# Patient Record
Sex: FEMALE | Race: BLACK OR AFRICAN AMERICAN | NOT HISPANIC OR LATINO | Employment: OTHER | URBAN - METROPOLITAN AREA
[De-identification: names, ages, dates, MRNs, and addresses within clinical notes are randomized per-mention and may not be internally consistent; named-entity substitution may affect disease eponyms.]

---

## 2018-04-26 ENCOUNTER — HOSPITAL ENCOUNTER (OUTPATIENT)
Dept: RHEUMATOLOGY | Facility: CLINIC | Age: 63
Discharge: HOME | End: 2018-04-26
Attending: NURSE PRACTITIONER | Admitting: NURSE PRACTITIONER

## 2019-04-05 ENCOUNTER — HOSPITAL ENCOUNTER (OUTPATIENT)
Dept: RHEUMATOLOGY | Facility: CLINIC | Age: 64
Discharge: HOME | End: 2019-04-05
Attending: NURSE PRACTITIONER | Admitting: NURSE PRACTITIONER

## 2019-07-15 ENCOUNTER — LAB (OUTPATIENT)
Dept: LAB | Facility: HOSPITAL | Age: 64
End: 2019-07-15

## 2019-07-15 ENCOUNTER — TRANSCRIBE ORDERS (OUTPATIENT)
Dept: ADMINISTRATIVE | Facility: HOSPITAL | Age: 64
End: 2019-07-15

## 2019-07-15 DIAGNOSIS — Z79.899 HIGH RISK MEDICATION USE: ICD-10-CM

## 2019-07-15 DIAGNOSIS — Z79.899 HIGH RISK MEDICATION USE: Primary | ICD-10-CM

## 2019-07-15 LAB
ALBUMIN SERPL-MCNC: 3.8 G/DL (ref 3.5–4.8)
ALBUMIN/GLOB SERPL: 1 G/DL (ref 1–1.7)
ALP SERPL-CCNC: 56 U/L (ref 32–91)
ALT SERPL W P-5'-P-CCNC: 33 U/L (ref 14–54)
ANION GAP SERPL CALCULATED.3IONS-SCNC: 13.8 MMOL/L (ref 5–15)
AST SERPL-CCNC: 31 U/L (ref 15–41)
BASOPHILS # BLD AUTO: 0 10*3/MM3 (ref 0–0.2)
BASOPHILS NFR BLD AUTO: 0.2 % (ref 0–1.5)
BILIRUB CONJ SERPL-MCNC: 0.1 MG/DL (ref 0.1–0.5)
BILIRUB SERPL-MCNC: 0.7 MG/DL (ref 0.3–1.2)
BUN BLD-MCNC: 15 MG/DL (ref 8–20)
BUN/CREAT SERPL: 16.7 (ref 5.4–26.2)
CALCIUM SPEC-SCNC: 9.7 MG/DL (ref 8.9–10.3)
CHLORIDE SERPL-SCNC: 103 MMOL/L (ref 101–111)
CO2 SERPL-SCNC: 24 MMOL/L (ref 22–32)
CREAT BLD-MCNC: 0.9 MG/DL (ref 0.4–1)
CRP SERPL-MCNC: 0.49 MG/DL (ref 0–0.7)
DEPRECATED RDW RBC AUTO: 54.3 FL (ref 37–54)
EOSINOPHIL # BLD AUTO: 0 10*3/MM3 (ref 0–0.4)
EOSINOPHIL NFR BLD AUTO: 0.2 % (ref 0.3–6.2)
ERYTHROCYTE [DISTWIDTH] IN BLOOD BY AUTOMATED COUNT: 16.3 % (ref 12.3–15.4)
ERYTHROCYTE [SEDIMENTATION RATE] IN BLOOD: 64 MM/HR (ref 0–30)
GFR SERPL CREATININE-BSD FRML MDRD: 76 ML/MIN/1.73
GLOBULIN UR ELPH-MCNC: 4 GM/DL (ref 2.5–3.8)
GLUCOSE BLD-MCNC: 131 MG/DL (ref 65–99)
HCT VFR BLD AUTO: 35.3 % (ref 34–46.6)
HGB BLD-MCNC: 11.5 G/DL (ref 12–15.9)
LYMPHOCYTES # BLD AUTO: 2 10*3/MM3 (ref 0.7–3.1)
LYMPHOCYTES NFR BLD AUTO: 21.6 % (ref 19.6–45.3)
MCH RBC QN AUTO: 31 PG (ref 26.6–33)
MCHC RBC AUTO-ENTMCNC: 32.7 G/DL (ref 31.5–35.7)
MCV RBC AUTO: 94.9 FL (ref 79–97)
MONOCYTES # BLD AUTO: 1.3 10*3/MM3 (ref 0.1–0.9)
MONOCYTES NFR BLD AUTO: 13.9 % (ref 5–12)
NEUTROPHILS # BLD AUTO: 5.8 10*3/MM3 (ref 1.7–7)
NEUTROPHILS NFR BLD AUTO: 64.1 % (ref 42.7–76)
NRBC BLD AUTO-RTO: 0.1 /100 WBC (ref 0–0.2)
PLATELET # BLD AUTO: 347 10*3/MM3 (ref 140–450)
PMV BLD AUTO: 7 FL (ref 6–12)
POTASSIUM BLD-SCNC: 3.8 MMOL/L (ref 3.6–5.1)
PROT SERPL-MCNC: 7.8 G/DL (ref 6.1–7.9)
RBC # BLD AUTO: 3.72 10*6/MM3 (ref 3.77–5.28)
SODIUM BLD-SCNC: 137 MMOL/L (ref 136–144)
WBC NRBC COR # BLD: 9.1 10*3/MM3 (ref 3.4–10.8)

## 2019-07-15 PROCEDURE — 36415 COLL VENOUS BLD VENIPUNCTURE: CPT

## 2019-07-15 PROCEDURE — 80053 COMPREHEN METABOLIC PANEL: CPT

## 2019-07-15 PROCEDURE — 85025 COMPLETE CBC W/AUTO DIFF WBC: CPT

## 2019-07-15 PROCEDURE — 86140 C-REACTIVE PROTEIN: CPT

## 2019-07-15 PROCEDURE — 82248 BILIRUBIN DIRECT: CPT

## 2019-07-15 PROCEDURE — 85652 RBC SED RATE AUTOMATED: CPT

## 2019-09-04 ENCOUNTER — INPATIENT HOSPITAL (OUTPATIENT)
Dept: URBAN - METROPOLITAN AREA HOSPITAL 76 | Facility: HOSPITAL | Age: 64
End: 2019-09-04

## 2019-09-04 DIAGNOSIS — R19.7 DIARRHEA, UNSPECIFIED: ICD-10-CM

## 2019-09-04 DIAGNOSIS — I10 ESSENTIAL (PRIMARY) HYPERTENSION: ICD-10-CM

## 2019-09-04 DIAGNOSIS — R11.2 NAUSEA WITH VOMITING, UNSPECIFIED: ICD-10-CM

## 2019-09-04 DIAGNOSIS — D64.89 OTHER SPECIFIED ANEMIAS: ICD-10-CM

## 2019-09-04 DIAGNOSIS — E86.0 DEHYDRATION: ICD-10-CM

## 2019-09-04 DIAGNOSIS — E11.9 TYPE 2 DIABETES MELLITUS WITHOUT COMPLICATIONS: ICD-10-CM

## 2019-09-04 PROCEDURE — 99222 1ST HOSP IP/OBS MODERATE 55: CPT | Performed by: NURSE PRACTITIONER

## 2019-09-05 ENCOUNTER — INPATIENT HOSPITAL (OUTPATIENT)
Dept: URBAN - METROPOLITAN AREA HOSPITAL 76 | Facility: HOSPITAL | Age: 64
End: 2019-09-05

## 2019-09-05 DIAGNOSIS — I10 ESSENTIAL (PRIMARY) HYPERTENSION: ICD-10-CM

## 2019-09-05 DIAGNOSIS — R19.7 DIARRHEA, UNSPECIFIED: ICD-10-CM

## 2019-09-05 DIAGNOSIS — R11.2 NAUSEA WITH VOMITING, UNSPECIFIED: ICD-10-CM

## 2019-09-05 DIAGNOSIS — D64.89 OTHER SPECIFIED ANEMIAS: ICD-10-CM

## 2019-09-05 DIAGNOSIS — E11.9 TYPE 2 DIABETES MELLITUS WITHOUT COMPLICATIONS: ICD-10-CM

## 2019-09-05 DIAGNOSIS — E86.0 DEHYDRATION: ICD-10-CM

## 2019-09-05 PROCEDURE — 99231 SBSQ HOSP IP/OBS SF/LOW 25: CPT | Performed by: NURSE PRACTITIONER

## 2019-10-30 ENCOUNTER — LAB (OUTPATIENT)
Dept: LAB | Facility: HOSPITAL | Age: 64
End: 2019-10-30

## 2019-10-30 ENCOUNTER — OFFICE VISIT (OUTPATIENT)
Dept: RHEUMATOLOGY | Facility: CLINIC | Age: 64
End: 2019-10-30

## 2019-10-30 VITALS
HEIGHT: 64 IN | SYSTOLIC BLOOD PRESSURE: 127 MMHG | WEIGHT: 221 LBS | HEART RATE: 60 BPM | DIASTOLIC BLOOD PRESSURE: 85 MMHG | BODY MASS INDEX: 37.73 KG/M2

## 2019-10-30 DIAGNOSIS — M05.9 SEROPOSITIVE RHEUMATOID ARTHRITIS (HCC): ICD-10-CM

## 2019-10-30 DIAGNOSIS — M17.0 OSTEOARTHRITIS OF BOTH KNEES, UNSPECIFIED OSTEOARTHRITIS TYPE: ICD-10-CM

## 2019-10-30 DIAGNOSIS — M54.50 LOW BACK PAIN WITHOUT SCIATICA, UNSPECIFIED BACK PAIN LATERALITY, UNSPECIFIED CHRONICITY: ICD-10-CM

## 2019-10-30 DIAGNOSIS — Z79.899 LONG-TERM USE OF HIGH-RISK MEDICATION: ICD-10-CM

## 2019-10-30 DIAGNOSIS — M05.9 SEROPOSITIVE RHEUMATOID ARTHRITIS (HCC): Primary | ICD-10-CM

## 2019-10-30 DIAGNOSIS — Z23 NEED FOR VACCINATION: ICD-10-CM

## 2019-10-30 PROCEDURE — G0008 ADMIN INFLUENZA VIRUS VAC: HCPCS | Performed by: INTERNAL MEDICINE

## 2019-10-30 PROCEDURE — 99214 OFFICE O/P EST MOD 30 MIN: CPT | Performed by: INTERNAL MEDICINE

## 2019-10-30 PROCEDURE — 90653 IIV ADJUVANT VACCINE IM: CPT | Performed by: INTERNAL MEDICINE

## 2019-10-30 RX ORDER — DULOXETIN HYDROCHLORIDE 30 MG/1
30 CAPSULE, DELAYED RELEASE ORAL 2 TIMES DAILY
Qty: 60 CAPSULE | Refills: 2 | Status: SHIPPED | OUTPATIENT
Start: 2019-10-30

## 2019-10-30 RX ORDER — LISINOPRIL 10 MG/1
TABLET ORAL DAILY
Refills: 2 | COMMUNITY
Start: 2019-10-01

## 2019-10-30 RX ORDER — HYDROCODONE BITARTRATE AND ACETAMINOPHEN 7.5; 325 MG/1; MG/1
TABLET ORAL
Refills: 0 | COMMUNITY
Start: 2019-10-01

## 2019-10-30 RX ORDER — ATORVASTATIN CALCIUM 20 MG/1
TABLET, FILM COATED ORAL
Refills: 0 | COMMUNITY
Start: 2019-09-14

## 2019-10-30 RX ORDER — SULFASALAZINE 500 MG/1
2 TABLET ORAL 2 TIMES DAILY
COMMUNITY
Start: 2018-04-30

## 2019-10-30 RX ORDER — ERGOCALCIFEROL 1.25 MG/1
CAPSULE ORAL
Refills: 3 | COMMUNITY
Start: 2019-07-26

## 2019-11-01 ENCOUNTER — TELEPHONE (OUTPATIENT)
Dept: RHEUMATOLOGY | Facility: CLINIC | Age: 64
End: 2019-11-01

## 2019-11-01 DIAGNOSIS — M54.50 LOW BACK PAIN WITHOUT SCIATICA, UNSPECIFIED BACK PAIN LATERALITY, UNSPECIFIED CHRONICITY: Primary | ICD-10-CM

## 2019-11-01 DIAGNOSIS — M17.0 OSTEOARTHRITIS OF BOTH KNEES, UNSPECIFIED OSTEOARTHRITIS TYPE: ICD-10-CM

## 2019-11-01 RX ORDER — DULOXETIN HYDROCHLORIDE 30 MG/1
CAPSULE, DELAYED RELEASE ORAL
Qty: 180 CAPSULE | Refills: 2 | OUTPATIENT
Start: 2019-11-01

## 2019-11-01 NOTE — TELEPHONE ENCOUNTER
Ysabel called c/o cymbalta causing her to have diarrhea. She says that she cannot take this medication and wants to know if you could put her on something else that would not cause this problem

## 2019-11-02 NOTE — TELEPHONE ENCOUNTER
Ask her to quit for a week and then resume taking 30 mg po every other day for about a week and if OK increase the dose to 30 mg po daily for a month or so, if she tolerates the medication, increase to 30 mg po bid. We will go from there.    I put orders for labs to be done after her visit. I did not see results yet. I see that they were cancelled. Just want to make sure those labs are done. Thanks!

## 2019-11-04 NOTE — TELEPHONE ENCOUNTER
Informed pt of your note, but with the way that the medication makes her feel she does not want to take it at all. I informed pt that the next step was for her to just do PT. Pt is willing to do PT. Order put in

## 2019-11-15 DIAGNOSIS — M05.9 SEROPOSITIVE RHEUMATOID ARTHRITIS (HCC): Primary | ICD-10-CM

## 2020-02-05 ENCOUNTER — OFFICE VISIT (OUTPATIENT)
Dept: RHEUMATOLOGY | Facility: CLINIC | Age: 65
End: 2020-02-05

## 2020-02-05 VITALS
WEIGHT: 211 LBS | BODY MASS INDEX: 36.02 KG/M2 | SYSTOLIC BLOOD PRESSURE: 112 MMHG | HEIGHT: 64 IN | DIASTOLIC BLOOD PRESSURE: 73 MMHG | HEART RATE: 76 BPM

## 2020-02-05 DIAGNOSIS — Z79.899 LONG-TERM USE OF HIGH-RISK MEDICATION: ICD-10-CM

## 2020-02-05 DIAGNOSIS — M17.0 OSTEOARTHRITIS OF BOTH KNEES, UNSPECIFIED OSTEOARTHRITIS TYPE: ICD-10-CM

## 2020-02-05 DIAGNOSIS — M05.9 SEROPOSITIVE RHEUMATOID ARTHRITIS (HCC): Primary | ICD-10-CM

## 2020-02-05 DIAGNOSIS — M25.532 WRIST PAIN, LEFT: ICD-10-CM

## 2020-02-05 PROCEDURE — 99213 OFFICE O/P EST LOW 20 MIN: CPT | Performed by: INTERNAL MEDICINE

## 2020-02-05 PROCEDURE — 20605 DRAIN/INJ JOINT/BURSA W/O US: CPT | Performed by: INTERNAL MEDICINE

## 2020-02-05 RX ORDER — METHYLPREDNISOLONE ACETATE 40 MG/ML
20 INJECTION, SUSPENSION INTRA-ARTICULAR; INTRALESIONAL; INTRAMUSCULAR; SOFT TISSUE ONCE
Status: COMPLETED | OUTPATIENT
Start: 2020-02-05 | End: 2020-02-05

## 2020-02-05 RX ORDER — PREDNISONE 10 MG/1
10 TABLET ORAL DAILY
Qty: 14 TABLET | Refills: 0 | Status: SHIPPED | OUTPATIENT
Start: 2020-02-05

## 2020-02-05 RX ADMIN — METHYLPREDNISOLONE ACETATE 20 MG: 40 INJECTION, SUSPENSION INTRA-ARTICULAR; INTRALESIONAL; INTRAMUSCULAR; SOFT TISSUE at 15:00

## 2020-02-05 NOTE — PROGRESS NOTES
HPI:  The patient is a 64-year-old -American very pleasant female who comes today in follow-up for management of seropositive rheumatoid arthritis.  The patient takes Xeljanz 11 mg p.o. daily, sulfasalazine 2 g a day.  She responded very well to this medication in the past and she has been doing very well up until 7 to 10 days ago when she developed progressive joint pain in both wrists but most prominent on the left.  She has not noticed joint swelling, no erythema.  Overall she is feeling more symptomatic.  Today her pain is 6 out of 10, she has stiffness in knees that lasts all day long.    On the last visit, I started her on Cymbalta for management of osteoarthritis of the knees and pain in the lower extremities, she started with 30 mg p.o. a day and was able to tolerate the medication relatively well but when she increased the medication to 60 mg a day she developed diarrhea.  She is currently using the lower dose of Cymbalta with good results.    She is known to have problems with her low back and sees pain management, she received a set of injections about a week ago but has not noticed any major changes yet.    Review of systems is negative for fever or chills, she has been intentionally losing some weight.  No dry eyes or dry mouth, no oral nasal ulcers, denies nausea vomiting, no diarrhea, no chest pain or shortness of breath.  All other systems reviewed and they were negative.    Laboratories reviewed for this visit show normal kidney and liver function tests, she has a ESR 46, CRP 0.36 the rest of the laboratories are for the most part unremarkable.    Social and family history reviewed and unchanged.    Rheumatologic history:  Sero (+) RA, CCP > 250 diagnosed > 20 years ago  Failure to: Enbrel, other anti TNF and MTX  Xeljanz restarted on February 14, 2017  Sulfasalazine started on September 22, 2017        hepatitis panel (4/18) negative  QUANT GOLD (10/18) negative           Active Ambulatory  Problems     Diagnosis Date Noted   • No Active Ambulatory Problems     Resolved Ambulatory Problems     Diagnosis Date Noted   • No Resolved Ambulatory Problems     Past Medical History:   Diagnosis Date   • Arthritis    • History of chicken pox    • History of measles    • HTN (hypertension)    • Migraine headache          Current Outpatient Medications   Medication Sig Dispense Refill   • atorvastatin (LIPITOR) 20 MG tablet TK 1 T PO Q NIGHT  0   • DULoxetine (CYMBALTA) 30 MG capsule Take 1 capsule by mouth 2 (Two) Times a Day. 60 capsule 2   • HYDROcodone-acetaminophen (NORCO) 7.5-325 MG per tablet TK 1 T PO QID UTD  0   • lisinopril (PRINIVIL,ZESTRIL) 10 MG tablet Take  by mouth Daily.  2   • sulfaSALAzine (AZULFIDINE) 500 MG tablet Take 2 tablets by mouth 2 (Two) Times a Day.     • Tofacitinib Citrate (XELJANZ) 11 MG tablet sustained-release 24 hour Take 1 tablet by mouth Daily. 90 tablet 0   • vitamin D (ERGOCALCIFEROL) 1.25 MG (55418 UT) capsule capsule TK 1 C PO 1 TIME A WK  3     No current facility-administered medications for this visit.        Physical exam:    Vitals:    02/05/20 1046   BP: 112/73   Pulse: 76     GENERAL: Well-developed, well-nourished in no acute distress. Alert and oriented x3.  HEENT: Normocephalic, atraumatic. Pupils are equal, round, and reactive to light. Extraocular muscles are intact. Mucous membranes are pink and moist. Nostrils are clear.   NECK: Supple without lymphadenopathy.  LUNGS: Clear to auscultation bilaterally.  HEART: Regular rate and rhythm without murmur, rub or gallop.  CHEST: Respirations easy and unlabored.  EXTREMITIES: No cyanosis, edema or clubbing.  SKIN: Warm, dry and intact.  MSK: Tenderness in the left wrist.  Preserved range of motion.  Mild tenderness in 3 MCP joints.  No synovitis noted.    Assessment:  1.  Seropositive rheumatoid arthritis.  Deteriorated.  The patient is currently having a flare of her RA.  Will proceed with a corticosteroid  injection in the wrist and give her a low-dose of prednisone for 2 weeks..  We will continue with the same treatment otherwise with no changes.    2.  Long-term high-risk medications, the patient is on medication for management of inflammatory arthritis.  I am monitoring for side effects.  Recommend to update her vaccinations and age-appropriate cancer screening.    Cancer screening   Colonoscopy:4yrs PAP: unknown Mammogram: 2018  Bone health  calcium and vitamin D: No, DXA: 02/18/2016  Vaccines  Flu: 2019 PNA: (13) 12/21/2017 Zoster: no   X-rays of the chest: 12/2016 Hands: 12/12/2016 Feet: 2014  Hepatitis panel, HIV, QTB/PPD:  12/15/2016      3.  Left wrist pain.  I will proceed with corticosteroid ration of the left wrist.    Wrist injection procedure note:    The risks and the benefits as well as the most common side effects were explained in detail to the patient and after the patient consented to the procedure, the area was prepped in an aseptic fashion and 20 mg of Depo-Medrol + 0.5 mL of lidocaine were introduced in the joint space.  The procedure did not have any immediate complications and tolerated it well.  The patient was recommended to contact our office if there were any signs of infection or worsening symptoms.    The patient was discharged at her baseline with stable vital signs.    4. body mass index of 36.  Improving, she is intentionally trying to lose weight.     5. Osteoarthritis of the knees.  Continue with Cymbalta 30 mg p.o. a day.    Plan:  Continue with Xeljanz 11 mg 1 tablet p.o. daily  Continue sulfasalazine 2 g a day  Corticosteroid injection in the left wrist  Prednisone 10 mg p.o. daily for 2 weeks  Continue with Cymbalta 30 mg p.o. daily  Weight loss through healthy diet and exercise  RTC 3 months or sooner if needed.    Orders Placed This Encounter   Procedures   • Comprehensive Metabolic Panel     Standing Status:   Future   • C-reactive Protein     Standing Status:   Future   •  Sedimentation Rate     Standing Status:   Future   • CBC With Manual Differential     Standing Status:   Future

## 2020-02-07 ENCOUNTER — TELEPHONE (OUTPATIENT)
Dept: RHEUMATOLOGY | Facility: CLINIC | Age: 65
End: 2020-02-07

## 2020-02-07 NOTE — TELEPHONE ENCOUNTER
Xeljanz - Received PAP form from [employer].  This is filled out and on Dr. CRANE's desk for signature.

## 2020-02-25 ENCOUNTER — TELEPHONE (OUTPATIENT)
Dept: RHEUMATOLOGY | Facility: CLINIC | Age: 65
End: 2020-02-25

## 2020-02-25 DIAGNOSIS — Z79.899 LONG-TERM USE OF HIGH-RISK MEDICATION: ICD-10-CM

## 2020-02-25 DIAGNOSIS — M05.9 SEROPOSITIVE RHEUMATOID ARTHRITIS (HCC): Primary | ICD-10-CM

## 2020-02-25 NOTE — TELEPHONE ENCOUNTER
Xeljanz - Physician portion of PAP form filled out and signed.  Faxed to [employer], waiting on response.

## 2020-02-25 NOTE — TELEPHONE ENCOUNTER
Pt was given in injection at her last visit and you gave her prednisone for 2 weeks which she says helped. Now that she is out of the prednisone she has pain in her hand, arm, and back of neck. She wants to know what she should do about this

## 2020-02-26 DIAGNOSIS — M05.9 SEROPOSITIVE RHEUMATOID ARTHRITIS (HCC): ICD-10-CM

## 2020-02-26 DIAGNOSIS — Z79.899 LONG-TERM USE OF HIGH-RISK MEDICATION: ICD-10-CM

## 2020-02-26 RX ORDER — PREDNISONE 1 MG/1
5 TABLET ORAL DAILY
Qty: 30 TABLET | Refills: 0 | Status: SHIPPED | OUTPATIENT
Start: 2020-02-26

## 2020-02-26 RX ORDER — FAMOTIDINE 20 MG/1
20 TABLET, FILM COATED ORAL DAILY
Qty: 30 TABLET | Refills: 0 | Status: SHIPPED | OUTPATIENT
Start: 2020-02-26 | End: 2020-06-01

## 2020-02-26 NOTE — TELEPHONE ENCOUNTER
Please start her on prednisone 5 mg p.o. a day until I see her in the follow-up with close monitoring of her blood pressure and blood sugar.  Please asked her to take calcium and vitamin D.  Please send a prescription for Pepcid 20 mg p.o. a day, please explain to the patient that this is to protect her stomach from prednisone.  I will see her in follow-up as planned.  Please asked her to contact the office if she presents with worsening symptoms and in that case, I believe that we will need to change her therapy.

## 2020-02-28 RX ORDER — FAMOTIDINE 20 MG/1
20 TABLET, FILM COATED ORAL DAILY
Qty: 90 TABLET | OUTPATIENT
Start: 2020-02-28

## 2020-04-16 ENCOUNTER — LAB (OUTPATIENT)
Dept: LAB | Facility: HOSPITAL | Age: 65
End: 2020-04-16

## 2020-04-16 DIAGNOSIS — Z79.899 LONG-TERM USE OF HIGH-RISK MEDICATION: ICD-10-CM

## 2020-04-16 DIAGNOSIS — M17.0 OSTEOARTHRITIS OF BOTH KNEES, UNSPECIFIED OSTEOARTHRITIS TYPE: ICD-10-CM

## 2020-04-16 DIAGNOSIS — M05.9 SEROPOSITIVE RHEUMATOID ARTHRITIS (HCC): ICD-10-CM

## 2020-04-16 DIAGNOSIS — M25.532 WRIST PAIN, LEFT: ICD-10-CM

## 2020-04-16 LAB
ALBUMIN SERPL-MCNC: 4.7 G/DL (ref 3.5–5.2)
ALBUMIN/GLOB SERPL: 1.3 G/DL
ALP SERPL-CCNC: 77 U/L (ref 39–117)
ALT SERPL W P-5'-P-CCNC: 27 U/L (ref 1–33)
ANION GAP SERPL CALCULATED.3IONS-SCNC: 11.9 MMOL/L (ref 5–15)
AST SERPL-CCNC: 24 U/L (ref 1–32)
BILIRUB SERPL-MCNC: 0.3 MG/DL (ref 0.2–1.2)
BUN BLD-MCNC: 18 MG/DL (ref 8–23)
BUN/CREAT SERPL: 20.9 (ref 7–25)
CALCIUM SPEC-SCNC: 10.1 MG/DL (ref 8.6–10.5)
CHLORIDE SERPL-SCNC: 101 MMOL/L (ref 98–107)
CO2 SERPL-SCNC: 29.1 MMOL/L (ref 22–29)
CREAT BLD-MCNC: 0.86 MG/DL (ref 0.57–1)
CRP SERPL-MCNC: 0.05 MG/DL (ref 0–0.5)
DEPRECATED RDW RBC AUTO: 50.8 FL (ref 37–54)
ERYTHROCYTE [DISTWIDTH] IN BLOOD BY AUTOMATED COUNT: 14.8 % (ref 12.3–15.4)
ERYTHROCYTE [SEDIMENTATION RATE] IN BLOOD: 39 MM/HR (ref 0–30)
GFR SERPL CREATININE-BSD FRML MDRD: 80 ML/MIN/1.73
GLOBULIN UR ELPH-MCNC: 3.6 GM/DL
GLUCOSE BLD-MCNC: 98 MG/DL (ref 65–99)
HCT VFR BLD AUTO: 36.9 % (ref 34–46.6)
HGB BLD-MCNC: 11.9 G/DL (ref 12–15.9)
LYMPHOCYTES # BLD MANUAL: 2.36 10*3/MM3 (ref 0.7–3.1)
LYMPHOCYTES NFR BLD MANUAL: 20.6 % (ref 19.6–45.3)
LYMPHOCYTES NFR BLD MANUAL: 8.2 % (ref 5–12)
MCH RBC QN AUTO: 30.7 PG (ref 26.6–33)
MCHC RBC AUTO-ENTMCNC: 32.2 G/DL (ref 31.5–35.7)
MCV RBC AUTO: 95.1 FL (ref 79–97)
MONOCYTES # BLD AUTO: 0.94 10*3/MM3 (ref 0.1–0.9)
NEUTROPHILS # BLD AUTO: 8.15 10*3/MM3 (ref 1.7–7)
NEUTROPHILS NFR BLD MANUAL: 71.1 % (ref 42.7–76)
PLAT MORPH BLD: NORMAL
PLATELET # BLD AUTO: 381 10*3/MM3 (ref 140–450)
PMV BLD AUTO: 8.9 FL (ref 6–12)
POTASSIUM BLD-SCNC: 4.1 MMOL/L (ref 3.5–5.2)
PROT SERPL-MCNC: 8.3 G/DL (ref 6–8.5)
RBC # BLD AUTO: 3.88 10*6/MM3 (ref 3.77–5.28)
RBC MORPH BLD: NORMAL
SODIUM BLD-SCNC: 142 MMOL/L (ref 136–145)
WBC MORPH BLD: NORMAL
WBC NRBC COR # BLD: 11.46 10*3/MM3 (ref 3.4–10.8)

## 2020-04-16 PROCEDURE — 85007 BL SMEAR W/DIFF WBC COUNT: CPT

## 2020-04-16 PROCEDURE — 86140 C-REACTIVE PROTEIN: CPT

## 2020-04-16 PROCEDURE — 85652 RBC SED RATE AUTOMATED: CPT

## 2020-04-16 PROCEDURE — 85027 COMPLETE CBC AUTOMATED: CPT

## 2020-04-16 PROCEDURE — 80053 COMPREHEN METABOLIC PANEL: CPT

## 2020-05-06 ENCOUNTER — OFFICE VISIT (OUTPATIENT)
Dept: RHEUMATOLOGY | Facility: CLINIC | Age: 65
End: 2020-05-06

## 2020-05-06 VITALS — SYSTOLIC BLOOD PRESSURE: 140 MMHG | DIASTOLIC BLOOD PRESSURE: 82 MMHG | HEART RATE: 71 BPM

## 2020-05-06 DIAGNOSIS — M05.9 SEROPOSITIVE RHEUMATOID ARTHRITIS (HCC): ICD-10-CM

## 2020-05-06 DIAGNOSIS — M05.9 SEROPOSITIVE RHEUMATOID ARTHRITIS (HCC): Primary | ICD-10-CM

## 2020-05-06 DIAGNOSIS — Z79.899 LONG-TERM USE OF HIGH-RISK MEDICATION: ICD-10-CM

## 2020-05-06 DIAGNOSIS — M54.42 LOW BACK PAIN WITH LEFT-SIDED SCIATICA, UNSPECIFIED BACK PAIN LATERALITY, UNSPECIFIED CHRONICITY: ICD-10-CM

## 2020-05-06 DIAGNOSIS — M17.0 OSTEOARTHRITIS OF BOTH KNEES, UNSPECIFIED OSTEOARTHRITIS TYPE: ICD-10-CM

## 2020-05-06 PROCEDURE — 96372 THER/PROPH/DIAG INJ SC/IM: CPT | Performed by: INTERNAL MEDICINE

## 2020-05-06 PROCEDURE — 99442 PR PHYS/QHP TELEPHONE EVALUATION 11-20 MIN: CPT | Performed by: INTERNAL MEDICINE

## 2020-05-06 RX ORDER — LEFLUNOMIDE 20 MG/1
10 TABLET ORAL DAILY
Qty: 30 TABLET | Refills: 2 | Status: SHIPPED | OUTPATIENT
Start: 2020-05-06 | End: 2020-05-07

## 2020-05-06 RX ORDER — METHYLPREDNISOLONE ACETATE 80 MG/ML
80 INJECTION, SUSPENSION INTRA-ARTICULAR; INTRALESIONAL; INTRAMUSCULAR; SOFT TISSUE ONCE
Status: COMPLETED | OUTPATIENT
Start: 2020-05-06 | End: 2020-05-06

## 2020-05-06 RX ADMIN — METHYLPREDNISOLONE ACETATE 80 MG: 80 INJECTION, SUSPENSION INTRA-ARTICULAR; INTRALESIONAL; INTRAMUSCULAR; SOFT TISSUE at 14:06

## 2020-05-06 NOTE — PROGRESS NOTES
You have chosen to receive care through a telephone visit. Do you consent to use a telephone visit for your medical care today? Yes    HPI:  The patient is a 64-year-old female who suffers from seropositive rheumatoid arthritis she was seen in the office February 5, 2020, the patient at that time was having a flare of rheumatoid arthritis, her treatment consists of Xeljanz 11 mg 1 tablet p.o. daily, sulfasalazine 2 g a day and at that time, prednisone was added to her therapy for a couple of weeks but it was later continued, she was supposed to continue to take prednisone 19 this new appointment but she ran out of the medication and she has not been on it for a while.    Today the patient reports pain on the right buttock area that comes from her lower back, it is a burning pain, reports that is not the first time that she had this problem.  It is present most of the times, rated 6 out of 10, it is interfering with her ambulation.    With regards of her inflammatory arthritis, she feels that Xeljanz is still working very well for her continues to be achy and symptomatic especially in her wrist.  The pain is 4 out of 10, she has variable morning stiffness, she has not noticed recent joint swelling.    Laboratories done 4/16/2020 continue to show elevated ESR at 39, previously 64 CRP is normal 0.05, WBCs 11.4, hemoglobin 11.9.  Platelets normal.    Review of systems is negative for fever or chills, no chest pain, no shortness of breath or cough, no abdominal pain, nausea, vomiting, diarrhea or constipation.  All other systems reviewed and they were negative.      Past Medical History:   Diagnosis Date   • Arthritis    • History of chicken pox    • History of measles    • HTN (hypertension)    • Migraine headache        Current Outpatient Medications   Medication Sig Dispense Refill   • atorvastatin (LIPITOR) 20 MG tablet TK 1 T PO Q NIGHT  0   • HYDROcodone-acetaminophen (NORCO) 7.5-325 MG per tablet TK 1 T PO QID UTD   0   • lisinopril (PRINIVIL,ZESTRIL) 10 MG tablet Take  by mouth Daily.  2   • sulfaSALAzine (AZULFIDINE) 500 MG tablet Take 2 tablets by mouth 2 (Two) Times a Day.     • Tofacitinib Citrate (XELJANZ) 11 MG tablet sustained-release 24 hour Take 1 tablet by mouth Daily. 90 tablet 0   • vitamin D (ERGOCALCIFEROL) 1.25 MG (52830 UT) capsule capsule TK 1 C PO 1 TIME A WK  3   • DULoxetine (CYMBALTA) 30 MG capsule Take 1 capsule by mouth 2 (Two) Times a Day. 60 capsule 2   • famotidine (PEPCID) 20 MG tablet Take 1 tablet by mouth Daily. 30 tablet 0   • predniSONE (DELTASONE) 10 MG tablet Take 1 tablet by mouth Daily. For 2 weeks 14 tablet 0   • predniSONE (DELTASONE) 5 MG tablet Take 1 tablet by mouth Daily. 30 tablet 0     No current facility-administered medications for this visit.        Physical exam:    There were no vitals filed for this visit.     Assessment:  1.  Seropositive rheumatoid arthritis.  With the obvious limitations of a phone visit, it is my impression that this patient continues to have active disease.  I reviewed her laboratories which continue to show elevated ESR, although better, continues to be elevated.  I did discuss with the patient regarding possibly changing her therapy but she is reluctant at this time.  Plan to stop sulfasalazine and add on leflunomide.    2.  Long-term high-risk medications, the patient is on medications for management of inflammatory arthritis.  I am monitoring for side effects.    Cancer screening   Colonoscopy:4yrs PAP: unknown Mammogram: 2018  Bone health  calcium and vitamin D: No, DXA: 02/18/2016  Vaccines  Flu: 2019 PNA: (13) 12/21/2017 Zoster: no   X-rays of the chest: 12/2016 Hands: 11/4/19 Feet: 11/4/2019  Hepatitis panel 4/5/19, HIV, QTB/PPD 10/30/2019    #3  Right -sided hip pain and low back pain.  Could be sciatica.  She will be evaluated in the office later today.  Plan to proceed with Depo-Medrol injection as needed.    4.  Osteoarthritis of the knees.   Continue with Cymbalta 30 mg p.o. daily.    Plan:  For now continue with Xeljanz 11 mg 1 tablet p.o. daily  Stop sulfasalazine  Start leflunomide 20 mg p.o. daily  The patient will be seen in the office and she will have  corticosteroid injection: Depo-Medrol injection 80 mg once IM.  Continue with Cymbalta 30 mg 1 tablet p.o. daily  Labs to be done prior to her next appointment  RTC 3 months or sooner if needed    20 minutes were spent in this encounter.    Orders Placed This Encounter   Procedures   • Comprehensive Metabolic Panel     Standing Status:   Future     Standing Expiration Date:   5/6/2021   • C-reactive Protein     Standing Status:   Future     Standing Expiration Date:   5/6/2021   • Sedimentation Rate     Standing Status:   Future     Standing Expiration Date:   5/6/2021   • TSPOT   • CBC With Manual Differential     Standing Status:   Future       The patient was seen in the office, she has tendernessin the left wrist 2 PIP joints and 3 MCP joints on the left hand.  There are no obvious signs of synovitis.  Her physical examination also showed straight leg raise testing positive on the right.    There will be no changes with the plan of care as stated above when discussed at the phone visit

## 2020-05-07 RX ORDER — LEFLUNOMIDE 20 MG/1
TABLET ORAL
Qty: 45 TABLET | OUTPATIENT
Start: 2020-05-07

## 2020-05-07 RX ORDER — LEFLUNOMIDE 20 MG/1
20 TABLET ORAL DAILY
Qty: 90 TABLET | Refills: 0 | Status: SHIPPED | OUTPATIENT
Start: 2020-05-07

## 2020-05-31 DIAGNOSIS — M05.9 SEROPOSITIVE RHEUMATOID ARTHRITIS (HCC): ICD-10-CM

## 2020-05-31 DIAGNOSIS — Z79.899 LONG-TERM USE OF HIGH-RISK MEDICATION: ICD-10-CM

## 2020-06-01 DIAGNOSIS — M05.9 SEROPOSITIVE RHEUMATOID ARTHRITIS (HCC): ICD-10-CM

## 2020-06-01 DIAGNOSIS — Z79.899 LONG-TERM USE OF HIGH-RISK MEDICATION: ICD-10-CM

## 2020-06-01 RX ORDER — FAMOTIDINE 20 MG/1
20 TABLET, FILM COATED ORAL DAILY
Qty: 30 TABLET | Refills: 0 | Status: SHIPPED | OUTPATIENT
Start: 2020-06-01

## 2020-06-01 RX ORDER — FAMOTIDINE 20 MG/1
20 TABLET, FILM COATED ORAL DAILY
Qty: 90 TABLET | OUTPATIENT
Start: 2020-06-01

## 2021-08-26 ENCOUNTER — TRANSCRIBE ORDERS (OUTPATIENT)
Dept: HOME HEALTH SERVICES | Facility: HOME HEALTHCARE | Age: 66
End: 2021-08-26

## 2021-08-26 ENCOUNTER — HOME HEALTH ADMISSION (OUTPATIENT)
Dept: HOME HEALTH SERVICES | Facility: HOME HEALTHCARE | Age: 66
End: 2021-08-26

## 2021-08-26 DIAGNOSIS — M17.11 OSTEOARTHRITIS OF RIGHT KNEE, UNSPECIFIED OSTEOARTHRITIS TYPE: Primary | ICD-10-CM

## 2021-08-27 ENCOUNTER — HOME CARE VISIT (OUTPATIENT)
Dept: HOME HEALTH SERVICES | Facility: HOME HEALTHCARE | Age: 66
End: 2021-08-27

## 2021-09-07 DIAGNOSIS — Z12.31 SCREENING MAMMOGRAM, ENCOUNTER FOR: Primary | ICD-10-CM

## 2021-09-07 DIAGNOSIS — Z12.31 BREAST CANCER SCREENING BY MAMMOGRAM: Primary | ICD-10-CM

## 2021-10-27 ENCOUNTER — APPOINTMENT (OUTPATIENT)
Dept: MAMMOGRAPHY | Facility: HOSPITAL | Age: 66
End: 2021-10-27

## 2021-11-17 ENCOUNTER — APPOINTMENT (OUTPATIENT)
Dept: MAMMOGRAPHY | Facility: HOSPITAL | Age: 66
End: 2021-11-17